# Patient Record
Sex: MALE | Race: WHITE | NOT HISPANIC OR LATINO | URBAN - METROPOLITAN AREA
[De-identification: names, ages, dates, MRNs, and addresses within clinical notes are randomized per-mention and may not be internally consistent; named-entity substitution may affect disease eponyms.]

---

## 2017-02-10 ENCOUNTER — HOSPITAL ENCOUNTER (OUTPATIENT)
Dept: PHYSICAL THERAPY | Facility: HOSPITAL | Age: 47
Setting detail: RECURRING SERIES
Discharge: HOME | End: 2017-04-13
Attending: SURGERY | Admitting: SURGERY

## 2018-02-08 ENCOUNTER — CONVERSION ENCOUNTER (OUTPATIENT)
Dept: SURGERY | Facility: CLINIC | Age: 48
End: 2018-02-08

## 2018-02-13 ENCOUNTER — HOSPITAL ENCOUNTER (OUTPATIENT)
Dept: PHYSICAL THERAPY | Facility: HOSPITAL | Age: 48
Setting detail: RECURRING SERIES
Discharge: HOME | End: 2018-04-21
Attending: SURGERY | Admitting: SURGERY

## 2019-06-04 VITALS
SYSTOLIC BLOOD PRESSURE: 143 MMHG | DIASTOLIC BLOOD PRESSURE: 89 MMHG | OXYGEN SATURATION: 100 % | HEART RATE: 60 BPM | RESPIRATION RATE: 20 BRPM | WEIGHT: 166.2 LBS | HEIGHT: 65 IN | BODY MASS INDEX: 27.69 KG/M2

## 2024-02-06 ENCOUNTER — TREATMENT (OUTPATIENT)
Dept: PHYSICAL THERAPY | Facility: CLINIC | Age: 54
End: 2024-02-06
Payer: COMMERCIAL

## 2024-02-06 DIAGNOSIS — M54.16 RADICULOPATHY, LUMBAR REGION: Primary | ICD-10-CM

## 2024-02-06 PROCEDURE — 97162 PT EVAL MOD COMPLEX 30 MIN: CPT | Performed by: PHYSICAL THERAPIST

## 2024-02-06 NOTE — PROGRESS NOTES
Physical Therapy Initial Evaluation and Plan of Care    Patient: Jeremías Joaquin   : 1970  Diagnosis/ICD-10 Code:  Radiculopathy, lumbar region [M54.16]  Referring practitioner: Tashi Martinez DO  Date of initial visit : 2024  Today's Date: 2024  Patient seen for 1  session    Visit Diagnoses:    ICD-10-CM ICD-9-CM   1. Radiculopathy, lumbar region  M54.16 724.4        Subjective Evaluation    History of Present Illness  Mechanism of injury: Patient is a 53 year old male who presents with a diagnosis of lumbar radiculopathy.  Reports 2 month history of back and left leg pain with no specific cause or change in activity. Symptoms would often include his left leg going numb with some improvement noted with standing and walking. Reports pain especially with siting and more in his glute than down his leg and reports some pain in his left groin as well.  Had recent chiropractic visit and had some improvement in symptoms.     Subjective comment: Revised Oswestry - 70%  Patient Occupation: Owner of sign company Quality of life: good    Pain  Current pain ratin  At best pain ratin  At worst pain ratin  Pain location: Lower back, left glute.  Quality: Nerve pain.  Relieving factors: medications and change in position  Exacerbated by: Sitting.  Progression: improved    Social Support  Lives in: multiple-level home    Diagnostic Tests  Abnormal x-ray: Had x-rays at chiropractic office - unsure of results.    Treatments  Previous treatment: chiropractic  Current treatment: physical therapy  Patient Goals  Patient goals for therapy: decreased pain             Objective          Static Posture     Comments  Swayback posture present    Neurological Testing     Sensation     Lumbar   Left   Intact: light touch    Right   Intact: light touch    Reflexes   Left   Patellar (L4): normal (2+)  Achilles (S1): trace (1+)    Right   Patellar (L4): normal (2+)  Achilles (S1): normal (2+)    Active  Range of Motion     Additional Active Range of Motion Details  FB 60% limited  BB 70% limited  RSB - 50% limited  LSB 60% limited   Hip ER - 25 R  30 L  Hip IR - 5 R   0 L   SLR R 30 degrees L 30 degrees    Narrow ATNHONY -    Strength/Myotome Testing     Lumbar   Left   Normal strength    Right   Normal strength    Tests     Additional Tests Details  FB/rotation + for pain L  Positive slump L LE for neural tension          Assessment & Plan       Assessment  Impairments: abnormal or restricted ROM, activity intolerance, lacks appropriate home exercise program and pain with function   Functional limitations: carrying objects, lifting, uncomfortable because of pain and sitting   Assessment details: Presents with signs and symptoms consistent with lumbar radiculopathy with limits to lumbar and B hip ROM, positive neural tension in L LE, symptom relief noted with positional traction of L lower lumbar spine and activity limits per the above.  He would benefit from skilled PT to address the above and restore to the highest level of prior function.  Prognosis: good    Goals  Plan Goals: ST. Independent and compliant with HEP over 3 weeks.   2. B hip IR/ER AROM improved 10 degrees or greater over 3 weeks.   3. To report 25% or greater decrease in lumbar and L LE pain over 3 weeks with ADL's.     LT. Revised Oswestry 20% or less over 10 weeks.   2. Lumbar spine AROM all planes 25% limits or less w/o pain over 10 weeks.   3. Able stand with stacked position of pelvis/ribcage w/o verbal cueing over 10 weeks.   4. Negative neural tension in L LE over 10 weeks.   5. To verbalize readiness for discharge per improved pain and function over 10 weeks.     Plan  Therapy options: will be seen for skilled therapy services  Planned modality interventions: cryotherapy, dry needling, electrical stimulation/Russian stimulation, TENS, thermotherapy (hydrocollator packs) and traction  Planned therapy interventions: manual therapy,  neuromuscular re-education, spinal/joint mobilization, soft tissue mobilization, functional ROM exercises, strengthening, stretching, gait training, home exercise program, therapeutic activities, transfer training and joint mobilization  Frequency: 2x week  Duration in weeks: 10  Treatment plan discussed with: patient        History # of Personal Factors and/or Comorbidities: MODERATE (1-2)  Examination of Body System(s): # of elements: MODERATE (3)  Clinical Presentation: EVOLVING  Clinical Decision Making: MODERATE       Timed:         Manual Therapy:    1     mins  74178;     Therapeutic Exercise:    6     mins  [id number];       Un-Timed:  Mod Eval     35     Mins  [id number]      Timed Treatment:   7   mins   Total Treatment:     42   mins          PT SIGNATURE: Roldan Arenas PT, DPT   [place] Lic #[id number]    DATE TREATMENT INITIATED: 2/6/2024    Initial Certification  Certification Period: 5/6/2024  I certify that the therapy services are furnished while this patient is under my care.  The services outlined above are required by this patient, and will be reviewed every 90 days.     PHYSICIAN: Tashi Martinez,       DATE:     Please sign and return via fax to [phone].. Thank you, [facility].

## 2024-03-05 ENCOUNTER — TELEPHONE (OUTPATIENT)
Dept: ORTHOPEDICS | Facility: OTHER | Age: 54
End: 2024-03-05
Payer: COMMERCIAL

## 2024-11-12 ENCOUNTER — TRANSCRIBE ORDERS (OUTPATIENT)
Dept: ADMINISTRATIVE | Facility: HOSPITAL | Age: 54
End: 2024-11-12
Payer: COMMERCIAL

## 2024-11-12 DIAGNOSIS — Z86.39 HISTORY OF HIGH CHOLESTEROL: Primary | ICD-10-CM

## 2024-11-12 DIAGNOSIS — Z82.49 FAMILY HISTORY OF ASCVD: ICD-10-CM

## 2025-04-23 ENCOUNTER — OFFICE VISIT (OUTPATIENT)
Dept: CARDIOLOGY | Facility: CLINIC | Age: 55
End: 2025-04-23
Payer: COMMERCIAL

## 2025-04-23 VITALS
HEIGHT: 65 IN | DIASTOLIC BLOOD PRESSURE: 103 MMHG | OXYGEN SATURATION: 98 % | RESPIRATION RATE: 16 BRPM | SYSTOLIC BLOOD PRESSURE: 144 MMHG | HEART RATE: 74 BPM | BODY MASS INDEX: 27.49 KG/M2 | WEIGHT: 165 LBS

## 2025-04-23 DIAGNOSIS — R07.2 PRECORDIAL CHEST PAIN: ICD-10-CM

## 2025-04-23 DIAGNOSIS — Z82.49 FAMILY HISTORY OF PREMATURE CORONARY ARTERY DISEASE: ICD-10-CM

## 2025-04-23 DIAGNOSIS — R03.0 ELEVATED BLOOD PRESSURE READING IN OFFICE WITHOUT DIAGNOSIS OF HYPERTENSION: ICD-10-CM

## 2025-04-23 DIAGNOSIS — I71.21 ANEURYSM OF ASCENDING AORTA WITHOUT RUPTURE: ICD-10-CM

## 2025-04-23 DIAGNOSIS — E78.2 MIXED HYPERLIPIDEMIA: Primary | ICD-10-CM

## 2025-04-23 PROCEDURE — 93000 ELECTROCARDIOGRAM COMPLETE: CPT | Performed by: INTERNAL MEDICINE

## 2025-04-23 PROCEDURE — 99204 OFFICE O/P NEW MOD 45 MIN: CPT | Performed by: INTERNAL MEDICINE

## 2025-04-23 RX ORDER — ATORVASTATIN CALCIUM 20 MG/1
20 TABLET, FILM COATED ORAL DAILY
COMMUNITY

## 2025-04-23 NOTE — PROGRESS NOTES
Date of Office Visit: 2025  Encounter Provider: Dr. Zack Kaur  Place of Service: [facility] [place]  Patient Name: Jeremías Joaquin  :1970  Jeremías Martins MD    Chief Complaint   Patient presents with    Consult     History of Present Illness:    I am pleased to see Mr. Joaquin in my office today as a new consultation.    As you know, patient is 55 years old white female whose past medical history significant for hyperlipidemia, premature coronary artery disease, who is referred to me for abnormal CT scan and left-sided chest pain.    Patient underwent screening CT scan of the chest for coronary calcification.  He is noted to have 0 Agatston score of coronary arteries however patient has noted to have aortic aneurysm with size of 4.4 cm.  Patient was having left-sided chest pain.  Patient described this as a dull ache.  There is no correlation with exertion.  No shortness of breath.  No orthopnea PND no syncope or presyncope.    Patient has strong family history for premature coronary artery disease with his father having coronary artery stenting.  Patient does not smoke or abuse alcohol.    EKG showed normal sinus rhythm.    I would recommend to proceed with stress echocardiography.  I would repeat CAT scan of aorta in 1 year.  I will see the patient in 6 months        Past Medical History:   Diagnosis Date    Hyperlipidemia          History reviewed. No pertinent surgical history.        Current Outpatient Medications:     atorvastatin (LIPITOR) 20 MG tablet, Take 1 tablet by mouth Daily., Disp: , Rfl:       Social History     Socioeconomic History    Marital status: Single   Tobacco Use    Smoking status: Never    Smokeless tobacco: Never   Vaping Use    Vaping status: Never Used   Substance and Sexual Activity    Alcohol use: Yes     Comment: rare    Drug use: Not Currently    Sexual activity: Defer         Review of Systems   Constitutional: Negative for chills and  "fever.   HENT:  Negative for ear discharge and nosebleeds.    Eyes:  Negative for discharge and redness.   Cardiovascular:  Positive for chest pain. Negative for orthopnea, palpitations, paroxysmal nocturnal dyspnea and syncope.   Respiratory:  Negative for cough, shortness of breath and wheezing.    Endocrine: Negative for heat intolerance.   Skin:  Negative for rash.   Musculoskeletal:  Negative for arthritis and myalgias.   Gastrointestinal:  Negative for abdominal pain, melena, nausea and vomiting.   Genitourinary:  Negative for dysuria and hematuria.   Neurological:  Negative for dizziness, light-headedness, numbness and tremors.   Psychiatric/Behavioral:  Negative for depression. The patient is not nervous/anxious.        Procedures      ECG 12 Lead    Date/Time: 4/23/2025 1:57 PM  Performed by: Zack Kaur MD    Authorized by: Zack Kaur MD  Previous ECG: no previous ECG available  Rhythm: sinus rhythm    Clinical impression: normal ECG          ECG 12 Lead    (Results Pending)           Objective:    BP (!) 144/103 (BP Location: Right arm, Patient Position: Sitting, Cuff Size: Large Adult)   Pulse 74   Resp 16   Ht 165 cm (64.96\")   Wt 74.8 kg (165 lb)   SpO2 98%   BMI 27.49 kg/m²         Constitutional:       Appearance: Well-developed.   Eyes:      General: No scleral icterus.        Right eye: No discharge.   HENT:      Head: Normocephalic and atraumatic.   Neck:      Thyroid: No thyromegaly.      Lymphadenopathy: No cervical adenopathy.   Pulmonary:      Effort: Pulmonary effort is normal. No respiratory distress.      Breath sounds: Normal breath sounds. No wheezing. No rales.   Cardiovascular:      Normal rate. Regular rhythm.      No gallop.    Edema:     Peripheral edema absent.   Abdominal:      Tenderness: There is no abdominal tenderness.   Skin:     Findings: No erythema or rash.   Neurological:      Mental Status: Alert and oriented to person, place, and time.             Assessment: "       Diagnosis Plan   1. Mixed hyperlipidemia  ECG 12 Lead    Adult Stress Echo W/ Cont or Stress Agent if Necessary Per Protocol      2. Precordial chest pain  Adult Stress Echo W/ Cont or Stress Agent if Necessary Per Protocol      3. Family history of premature coronary artery disease  Adult Stress Echo W/ Cont or Stress Agent if Necessary Per Protocol      4. Elevated blood pressure reading in office without diagnosis of hypertension        5. Aneurysm of ascending aorta without rupture                 Plan:       MDM:    1.  Precordial chest pain:    I would recommend to proceed with stress echocardiography    2.  Mixed hyperlipidemia:    Continue Lipitor.    3.  Elevated blood pressure without diagnosis of hypertension:    I advised the patient to monitor the blood pressure and bring the logbook.    4.  Ascending aortic aneurysm:    I will repeat CT scan in 1 year with